# Patient Record
Sex: MALE | Race: WHITE | Employment: FULL TIME | URBAN - NONMETROPOLITAN AREA
[De-identification: names, ages, dates, MRNs, and addresses within clinical notes are randomized per-mention and may not be internally consistent; named-entity substitution may affect disease eponyms.]

---

## 2017-11-20 ENCOUNTER — HOSPITAL ENCOUNTER (EMERGENCY)
Age: 27
Discharge: HOME | End: 2017-11-20
Payer: COMMERCIAL

## 2017-11-20 VITALS
WEIGHT: 220 LBS | SYSTOLIC BLOOD PRESSURE: 154 MMHG | HEIGHT: 72 IN | DIASTOLIC BLOOD PRESSURE: 98 MMHG | RESPIRATION RATE: 16 BRPM | BODY MASS INDEX: 29.8 KG/M2 | OXYGEN SATURATION: 98 % | HEART RATE: 88 BPM | TEMPERATURE: 97.3 F

## 2017-11-20 DIAGNOSIS — M79.605 LEFT LEG PAIN: Primary | ICD-10-CM

## 2017-11-20 LAB
BILIRUBIN URINE: NEGATIVE
BLOOD, URINE: NEGATIVE
CHARACTER, URINE: CLEAR
COLOR: YELLOW
GLUCOSE, URINE: NEGATIVE MG/DL
KETONES, URINE: NEGATIVE
LEUKOCYTES, UA: ABNORMAL
NITRATE, UA: NEGATIVE
PH UA: 7.5 (ref 5–9)
PROTEIN UA: 30 MG/DL
REFLEX TO URINE C & S: ABNORMAL
SPECIFIC GRAVITY UA: 1.02 (ref 1–1.03)
UROBILINOGEN, URINE: 0.2 EU/DL (ref 0–1)

## 2017-11-20 PROCEDURE — 81003 URINALYSIS AUTO W/O SCOPE: CPT

## 2017-11-20 PROCEDURE — 99202 OFFICE O/P NEW SF 15 MIN: CPT | Performed by: NURSE PRACTITIONER

## 2017-11-20 PROCEDURE — 87086 URINE CULTURE/COLONY COUNT: CPT

## 2017-11-20 PROCEDURE — 99215 OFFICE O/P EST HI 40 MIN: CPT

## 2017-11-20 ASSESSMENT — PAIN DESCRIPTION - ORIENTATION: ORIENTATION: LEFT

## 2017-11-20 ASSESSMENT — PAIN DESCRIPTION - PAIN TYPE: TYPE: ACUTE PAIN

## 2017-11-20 ASSESSMENT — ENCOUNTER SYMPTOMS
CONSTIPATION: 0
EYE PAIN: 0
VOMITING: 0
COUGH: 0
DIARRHEA: 0
RHINORRHEA: 0
COLOR CHANGE: 0
SHORTNESS OF BREATH: 0
BACK PAIN: 0
ABDOMINAL PAIN: 0
EYE DISCHARGE: 0
SORE THROAT: 0
WHEEZING: 0
STRIDOR: 0
NAUSEA: 0

## 2017-11-20 ASSESSMENT — PAIN DESCRIPTION - LOCATION: LOCATION: LEG

## 2017-11-20 NOTE — ED TRIAGE NOTES
Pt complains of having left leg pain. States he recently had rhabdo and now that the feeling is coming back to his leg he is having a lot of pain.

## 2017-11-20 NOTE — ED PROVIDER NOTES
Dami Franks [id number]  Urgent Care Encounter      CHIEF COMPLAINT       Chief Complaint   Patient presents with    Leg Pain       Nurses Notes reviewed and I agree except as noted in the HPI. HISTORY OF PRESENT ILLNESS   Unique Kim is a 32 y.o. male who presents With acute left leg pain the patient states is related to a recent episode of renal failure and rhabdomyolysis. He is requesting pain medication. Patient is from [facility] visiting in town for the holidays. REVIEW OF SYSTEMS     Review of Systems   Constitutional: Negative for activity change, appetite change, chills, fatigue and fever. HENT: Negative for congestion, ear pain, rhinorrhea and sore throat. Eyes: Negative for pain and discharge. Respiratory: Negative for cough, shortness of breath, wheezing and stridor. Cardiovascular: Negative for chest pain. Gastrointestinal: Negative for abdominal pain, constipation, diarrhea, nausea and vomiting. Genitourinary: Negative for dysuria, flank pain and frequency. Musculoskeletal: Positive for myalgias. Negative for arthralgias, back pain and neck pain. Skin: Negative for color change and rash. Allergic/Immunologic: Negative for immunocompromised state. Neurological: Negative for dizziness, weakness and headaches. Psychiatric/Behavioral: Negative. PAST MEDICAL HISTORY   History reviewed. No pertinent past medical history. SURGICAL HISTORY     Patient  has a past surgical history that includes Femur shortening (2004). CURRENT MEDICATIONS       There are no discharge medications for this patient. ALLERGIES     Patient is has No Known Allergies. FAMILY HISTORY     Patient's family history is not on file. SOCIAL HISTORY     Patient  reports that he has been smoking. He has been smoking about 0.50 packs per day. He has never used smokeless tobacco. He reports that he drinks alcohol. He reports that he does not use drugs.     PHYSICAL requesting pain medication only. His left leg is warm and dry with no edema, no calf or posterior knee pain or masses. He denies flying recently. States just having jeans on hurts his leg. Pt has difficulty giving a urine specimen due to voiding on his arrival at the urgent care. Patient states he has a history of DVT in his right thigh when he had a fractured femur. He states that \"this pain in my left leg is nothing like the pain I had with a blood clot in my right leg\". Urinalysis- trace leukocytes and a small amount of protein, otherwise normal.    Patient refuses Toradol injection. He states he is taken Tylenol for his pain and it is not working. He is repeatedly somewhat insistent on narcotic pain medication. Discussed with the patient that his symptoms obviate evaluation in the emergency department, due to the possibility of DVT, or other pathology not associated with a post-rhabdomyolysis state. His medical record from [place] is not available to verify any of his recent history, and this is also problematic. Patient initially states he agrees to go to the emergency department, then he declines. 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 Patient requests a work slip for one week. He is given today off in light of the fact he will not go to the emergency department for follow-up. Medications - No data to display  PROCEDURES:  None  FINAL IMPRESSION      1. Left leg pain        DISPOSITION/PLAN   DISPOSITION   PATIENT REFERRED TO:  [address] [place] [address], EMERGENCY DEPT  [address]  [phone]          DISCHARGE MEDICATIONS:  There are no discharge medications for this patient. There are no discharge medications for this patient.       Diony Burnham, ARYAN Burnham, ARYAN  11/20/17 790 [facility], NP  11/20/17 1010

## 2017-11-22 LAB
ORGANISM: ABNORMAL
URINE CULTURE REFLEX: ABNORMAL